# Patient Record
Sex: MALE | Race: WHITE | NOT HISPANIC OR LATINO | Employment: UNEMPLOYED | URBAN - METROPOLITAN AREA
[De-identification: names, ages, dates, MRNs, and addresses within clinical notes are randomized per-mention and may not be internally consistent; named-entity substitution may affect disease eponyms.]

---

## 2018-07-16 ENCOUNTER — APPOINTMENT (OUTPATIENT)
Dept: RADIOLOGY | Facility: MEDICAL CENTER | Age: 33
End: 2018-07-16
Attending: EMERGENCY MEDICINE

## 2018-07-16 ENCOUNTER — HOSPITAL ENCOUNTER (EMERGENCY)
Facility: MEDICAL CENTER | Age: 33
End: 2018-07-16
Attending: EMERGENCY MEDICINE

## 2018-07-16 VITALS
BODY MASS INDEX: 23.1 KG/M2 | HEIGHT: 71 IN | RESPIRATION RATE: 16 BRPM | TEMPERATURE: 97.5 F | DIASTOLIC BLOOD PRESSURE: 97 MMHG | OXYGEN SATURATION: 94 % | SYSTOLIC BLOOD PRESSURE: 119 MMHG | HEART RATE: 121 BPM | WEIGHT: 165 LBS
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
## 2018-07-16 DIAGNOSIS — Z91.199 MEDICALLY NONCOMPLIANT: ICD-10-CM

## 2018-07-16 DIAGNOSIS — F10.929 ACUTE ALCOHOLIC INTOXICATION WITH COMPLICATION (HCC): ICD-10-CM

## 2018-07-16 PROCEDURE — 99285 EMERGENCY DEPT VISIT HI MDM: CPT

## 2018-07-16 PROCEDURE — 72125 CT NECK SPINE W/O DYE: CPT

## 2018-07-16 PROCEDURE — 305950 HCHG YELLOW TRAUMA ACT PRE-NOTIFY NO CC

## 2018-07-16 PROCEDURE — 70450 CT HEAD/BRAIN W/O DYE: CPT

## 2018-07-16 ASSESSMENT — LIFESTYLE VARIABLES
DO YOU DRINK ALCOHOL: YES
CONSUMPTION TOTAL: POSITIVE
TOTAL SCORE: 0
HAVE YOU EVER FELT YOU SHOULD CUT DOWN ON YOUR DRINKING: NO
EVER FELT BAD OR GUILTY ABOUT YOUR DRINKING: NO
AVERAGE NUMBER OF DAYS PER WEEK YOU HAVE A DRINK CONTAINING ALCOHOL: 7
TOTAL SCORE: 0
HAVE PEOPLE ANNOYED YOU BY CRITICIZING YOUR DRINKING: NO
ON A TYPICAL DAY WHEN YOU DRINK ALCOHOL HOW MANY DRINKS DO YOU HAVE: 10
TOTAL SCORE: 0
EVER HAD A DRINK FIRST THING IN THE MORNING TO STEADY YOUR NERVES TO GET RID OF A HANGOVER: NO
HOW MANY TIMES IN THE PAST YEAR HAVE YOU HAD 5 OR MORE DRINKS IN A DAY: 365

## 2018-07-16 ASSESSMENT — PAIN SCALES - GENERAL: PAINLEVEL_OUTOF10: 0

## 2018-07-16 NOTE — ED NOTES
Patient yelling that he wants to leave and he doesn't need to be hear.  Patient cursing at staff to let him leave.  Dr. Walter aware.

## 2018-07-16 NOTE — ED NOTES
BIB [facility], pt fell from 30ft tree, confused and combative with head laceration and shoulder laceration. VSS.

## 2018-07-16 NOTE — DISCHARGE PLANNING
Medical Social Work    Referral: Trauma Yellow    Intervention: MSW responded to trauma bay.  Pt is a 33 year old [facility] falling approximately 30 feet out of a tree.  Pt ambulated into trauma bay.  Pt is Jaden Hill (: 1985).  Pt is +ETOH, somewhat confused and combative.  [employer] officer ARCELIA Rae is in trauma bay.  Pt to CT then BL16.    Plan: SW will follow as needed.

## 2018-07-16 NOTE — ED NOTES
Jaden Hill discharged via ambulatory with steady gait with self.  Discharge instructions given and reviewed, patient educated to follow up with PCP or as needed with ED, verbalized understanding.  Prescriptions given x 0.  All personal belongings in possession.  No questions at this time.

## 2018-07-16 NOTE — DISCHARGE INSTRUCTIONS
Head Injury, Adult  There are many types of head injuries. They can be as minor as a bump. Some head injuries can be worse. Worse injuries include:  · A strong hit to the head that hurts the brain (concussion).  · A bruise of the brain (contusion). This means there is bleeding in the brain that can cause swelling.  · A cracked skull (skull fracture).  · Bleeding in the brain that gathers, gets thick (makes a clot), and forms a bump (hematoma).  Most problems from a head injury come in the first 24 hours. However, you may still have side effects up to 7-10 days after your injury. It is important to watch your condition for any changes.  Follow these instructions at home:  Activity  · Rest as much as possible.  · Avoid activities that are hard or tiring.  · Make sure you get enough sleep.  · Limit activities that need a lot of thought or attention, such as:  ¨ Watching TV.  ¨ Playing memory games and puzzles.  ¨ Job-related work or homework.  ¨ Working on the computer, social media, and texting.  · Avoid activities that could cause another head injury until your doctor says it is okay. This includes playing sports.  · Ask your doctor when it is safe for you to go back to your normal activities, such as work or school. Ask your doctor for a step-by-step plan for slowly going back to your normal activities.  · Ask your doctor when you can drive, ride a bicycle, or use heavy machinery. Never do these activities if you are dizzy.  Lifestyle  · Do not drink alcohol until your doctor says it is okay.  · Avoid drug use.  · If it is harder than usual to remember things, write them down.  · If you are easily distracted, try to do one thing at a time.  · Talk with family members or close friends when making important decisions.  · Tell your friends, family, a trusted coworker, and  about your injury, symptoms, and limits (restrictions). Have them watch for any problems that are new or getting worse.  General  instructions  · Take over-the-counter and prescription medicines only as told by your doctor.  · Have someone stay with you for 24 hours after your head injury. This person should watch you for any changes in your symptoms and be ready to get help.  · Keep all follow-up visits as told by your doctor. This is important.  Prevention  · Work on your balance and strength. This can help you avoid falls.  · Wear a seatbelt when you are in a moving vehicle.  · Wear a helmet when:  ¨ Riding a bicycle.  ¨ Skiing.  ¨ Doing any other sport or activity that has a risk of injury.  · Drink alcohol only in moderation.  · Make your home safer by:  ¨ Getting rid of clutter from the floors and stairs, like things that can make you trip.  ¨ Using grab bars in bathrooms and handrails by stairs.  ¨ Placing non-slip mats on floors and in bathtubs.  ¨ Putting more light in dim areas.  Get help right away if:  · You have:  ¨ A very bad (severe) headache that is not helped by medicine.  ¨ Trouble walking or weakness in your arms and legs.  ¨ Clear or bloody fluid coming from your nose or ears.  ¨ Changes in your seeing (vision).  ¨ Jerky movements that you cannot control (seizure).  · You throw up (vomit).  · Your symptoms get worse.  · You lose balance.  · Your speech is slurred.  · You pass out.  · You are sleepier and have trouble staying awake.  · The black centers of your eyes (pupils) change in size.  These symptoms may be an emergency. Do not wait to see if the symptoms will go away. Get medical help right away. Call your local emergency services (911 in the [place] Do not drive yourself to the hospital.   This information is not intended to replace advice given to you by your health care provider. Make sure you discuss any questions you have with your health care provider.  Document Released: 11/30/2009 Document Revised: 07/13/2017 Document Reviewed: 06/27/2017  Elsevier Interactive Patient Education © 2017 Elsevier Inc.      How  "Much is Too Much Alcohol?  Drinking too much alcohol can cause injury, accidents, and health problems. These types of problems can include:   · Car crashes.  · Falls.  · Family fighting (domestic violence).  · Drowning.  · Fights.  · Injuries.  · Burns.  · Damage to certain organs.  · Having a baby with birth defects.  ONE DRINK CAN BE TOO MUCH WHEN YOU ARE:  · Working.  · Pregnant or breastfeeding.  · Taking medicines. Ask your doctor.  · Driving or planning to drive.  WHAT IS A STANDARD DRINK?   · 1 regular beer (12 ounces or 360 milliliters).  · 1 glass of wine (5 ounces or 150 milliliters).  · 1 shot of liquor (1.5 ounces or 45 milliliters).  BLOOD ALCOHOL LEVELS   · .00 A person is sober.  · .03 A person has no trouble keeping balance, talking, or seeing right, but a \"buzz\" may be felt.  · .05 A person feels \"buzzed\" and relaxed.  · .08 or .10  A person is drunk. He or she has trouble talking, seeing right, and keeping his or her balance.  · .15 A person loses body control and may pass out (blackout).  · .20 A person has trouble walking (staggering) and throws up (vomits).  · .30 A person will pass out (unconscious).  · .40+ A person will be in a coma. Death is possible.  If you or someone you know has a drinking problem, get help from a doctor.   Document Released: 10/14/2010 Document Revised: 03/11/2013 Document Reviewed: 10/14/2010  [employer]® Patient Information ©2014 [employer].    "

## 2018-07-16 NOTE — ED PROVIDER NOTES
"ED Provider Note    Scribed for No att. providers found by Timmy Mccrary. 7/16/2018  6:13 AM    Primary care provider: None noted  Means of arrival: EMS  History obtained from: EMS, patient  History limited by: uncooperative    CHIEF COMPLAINT  Chief Complaint   Patient presents with   • Trauma Yellow       HPI  Cadet Lynn is a 89 y.o. unknown who presents to the Emergency Department as a trauma yellow for medical clearance post fall from 30 feet out of a tree just prior to arrival. Per EMS the patient fell approximately 30 feet out of a tree. He was found to have a laceration to his posterior head transported to [facility] for medical clearance. Patient denies pain or any medical complaint at this time.    Further HPI cannot be obtained due to the patient's uncooperativeness.    REVIEW OF SYSTEMS  Pertinent positives include laceration.   Pertinent negatives include no medical complaints.    All other systems reviewed and negative.      Further ROS cannot be obtained due to the patient's uncooperativeness.  C.    PAST MEDICAL HISTORY   None note    SURGICAL HISTORY   has a past surgical history that includes other and ear reconstruction.    SOCIAL HISTORY  Social History   Substance Use Topics   • Smoking status: None noted   • Smokeless tobacco: None note   • Alcohol use None note      History   Drug use: None note       FAMILY HISTORY  None note    CURRENT MEDICATIONS  No current facility-administered medications for this encounter.   No current outpatient prescriptions on file.    ALLERGIES  No Known Allergies    PHYSICAL EXAM  VITAL SIGNS: BP (!) 119/97   Pulse 121   Temp 36.4 °C (97.5 °F)   Resp 16   Ht 1.803 m (5' 11\")   Wt 74.8 kg (165 lb)   SpO2 94%   BMI 23.01 kg/m²     Nursing note and vitals reviewed.  Constitutional: Well-developed and well-nourished. No distress.  Somewhat angry and argumentative.   HENT: Head is normocephalic. Oropharynx is clear and moist without exudate or erythema. " "Left sided 3x4 cm cephalohemoatoma to parietooccipal scalp. No repairable laceration.   Eyes: Pupils are equal, round, and reactive to light. Conjunctiva are normal.   Cardiovascular: Normal rate and regular rhythm. No murmur heard. Normal radial pulses.  Pulmonary/Chest: Breath sounds normal. No wheezes or rales.   Abdominal: Soft and non-tender. No distention    Musculoskeletal: Extremities exhibit normal range of motion without edema or tenderness. No cervical spine tenderness to palpation.  Neurological: Awake, alert and oriented to person, and time. Not oriented to place. No focal deficits noted.  Skin: Skin is warm and dry. No rash.   Psychiatric: Angry, argumentative. Claims there is \"nothing wrong with me\".     DIAGNOSTIC STUDIES / PROCEDURES    RADIOLOGY  CT-CSPINE WITHOUT PLUS RECONS   Final Result      No acute abnormality identified.      CT-HEAD W/O   Final Result      1.  LEFT parietal scalp hematoma and laceration   2.  No acute intracranial hemorrhage      The radiologist's interpretation of all radiological studies have been reviewed by me.    COURSE & MEDICAL DECISION MAKING  Nursing notes, VS, PMSFHx reviewed in chart.     6:13 AM - Patient seen and examined at bedside. Patient is refusing services in the ED and is unwilling to cooperate. I discussed his mechanism and possible injuries that he could have sustained during his fall. Patient appears cliniclaly intoxicated and uncooperative, and is not fully oriented. After some convincing he agrees to scan. Ordered CT head, CT C spine to evaluate his symptoms. The differential diagnoses include but are not limited to: spinal fracture, contusion, laceration     6:50 AM - Recheck: Patient re-evaluated at beside. He is seen ambulating to the bathroom unassisted and without difficulty. Patient reports that he is feeling well and expresses a desire to discharge. Patient's radiology results discussed. There are no signs of acute emergency. Discussed " patient's condition and treatment plan. Although he was initially not oriented to place, I feel that he is able to discharge as he is otherwise oriented, speaking clear full sentences and ambulatory without difficulty. I do not feel that his medical decision making is impaired at this time. Patient will be discharged with instructions and provided with strict return precautions. Advised to follow up with his primary. Instructed to return to Emergency Department immediately if any new or worsening symptoms.    The patient will return for new or worsening symptoms and is stable at the time of discharge.    The patient is referred to a primary physician for blood pressure management, diabetic screening, and for all other preventative health concerns.    DISPOSITION:  Patient will be discharged home in stable condition.    FOLLOW UP:  [facility], Emergency Dept  [address] [address] [phone]    If symptoms worsen      OUTPATIENT MEDICATIONS:  There are no discharge medications for this patient.        FINAL IMPRESSION  1. Fall, initial encounter    2. Acute alcoholic intoxication with complication (HCC)    3. Medically noncompliant          I, Timmy Mccrary (Jeanie), am scribing for, and in the presence of, No att. providers found.    Electronically signed by: Timmy Mccrary (Jeanie), 7/16/2018    I, No att. providers found personally performed the services described in this documentation, as scribed by Timmy Mccrary in my presence, and it is both accurate and complete.    The note accurately reflects work and decisions made by me.  Josh Walter  7/16/2018  1:16 PM

## 2018-07-16 NOTE — ED NOTES
Report from Fransisca RN at this time.  Patient wheeled to rm 16 at this time and helped into gurney

## 2019-10-29 PROCEDURE — 99283 EMERGENCY DEPT VISIT LOW MDM: CPT

## 2019-10-29 PROCEDURE — 303977 HCHG I & D

## 2019-10-30 ENCOUNTER — HOSPITAL ENCOUNTER (EMERGENCY)
Facility: MEDICAL CENTER | Age: 34
End: 2019-10-30
Attending: EMERGENCY MEDICINE
Payer: MEDICAID

## 2019-10-30 VITALS
TEMPERATURE: 97.2 F | HEIGHT: 71 IN | RESPIRATION RATE: 16 BRPM | WEIGHT: 150.13 LBS | HEART RATE: 86 BPM | SYSTOLIC BLOOD PRESSURE: 119 MMHG | OXYGEN SATURATION: 98 % | BODY MASS INDEX: 21.02 KG/M2 | DIASTOLIC BLOOD PRESSURE: 76 MMHG

## 2019-10-30 DIAGNOSIS — L02.519 ABSCESS OF INDEX FINGER: Primary | ICD-10-CM

## 2019-10-30 PROCEDURE — 700102 HCHG RX REV CODE 250 W/ 637 OVERRIDE(OP): Performed by: EMERGENCY MEDICINE

## 2019-10-30 PROCEDURE — A9270 NON-COVERED ITEM OR SERVICE: HCPCS | Performed by: EMERGENCY MEDICINE

## 2019-10-30 PROCEDURE — 303977 HCHG I & D

## 2019-10-30 RX ORDER — SULFAMETHOXAZOLE AND TRIMETHOPRIM 800; 160 MG/1; MG/1
1 TABLET ORAL 2 TIMES DAILY
Qty: 14 TAB | Refills: 0 | Status: SHIPPED | OUTPATIENT
Start: 2019-10-30 | End: 2019-11-06

## 2019-10-30 RX ORDER — SULFAMETHOXAZOLE AND TRIMETHOPRIM 800; 160 MG/1; MG/1
1 TABLET ORAL ONCE
Status: COMPLETED | OUTPATIENT
Start: 2019-10-30 | End: 2019-10-30

## 2019-10-30 RX ORDER — LIDOCAINE HYDROCHLORIDE 10 MG/ML
5 INJECTION, SOLUTION INFILTRATION; PERINEURAL ONCE
Status: DISCONTINUED | OUTPATIENT
Start: 2019-10-30 | End: 2019-10-30 | Stop reason: HOSPADM

## 2019-10-30 RX ADMIN — SULFAMETHOXAZOLE AND TRIMETHOPRIM 1 TABLET: 800; 160 TABLET ORAL at 01:30

## 2019-10-30 ASSESSMENT — ENCOUNTER SYMPTOMS
COUGH: 0
SHORTNESS OF BREATH: 0
HEADACHES: 0
ABDOMINAL PAIN: 0
CONSTIPATION: 0
DIARRHEA: 0
BLURRED VISION: 0
NAUSEA: 0
DIAPHORESIS: 0
VOMITING: 0
BRUISES/BLEEDS EASILY: 0
FEVER: 0
DIZZINESS: 0
CHILLS: 0
SORE THROAT: 0
PALPITATIONS: 0

## 2019-10-30 NOTE — ED PROVIDER NOTES
"ED Provider Note   10/30/2019  1:02 AM    Means of Arrival: Walk In  History obtained by: patient    CHIEF COMPLAINT  Chief Complaint   Patient presents with   • Digit Pain     R side index finger. pt thinks it is infected       HPI  Jaden Hill is a 34 y.o. male presents with pain at right index finger (points to distal lateral finger near DIP joint).  States about 1 month ago the finger was injured with a piece of glass has not completely healed since then.  Says that he was bumping his finger on things, works on bikes a lot. Over the past few days swelling and pain have worsened. Decreased range of motion due to pain.  No discharge or drainage. Throbbing pain. He says, \"It needs to be cut open.\"  Denies fevers, chills or any other acute complaints.  Has got a tetanus shot 3 years ago when he was in detention.    REVIEW OF SYSTEMS  Review of Systems   Constitutional: Negative for chills, diaphoresis and fever.   HENT: Negative for sore throat.    Eyes: Negative for blurred vision.   Respiratory: Negative for cough and shortness of breath.    Cardiovascular: Negative for chest pain, palpitations and leg swelling.   Gastrointestinal: Negative for abdominal pain, constipation, diarrhea, nausea and vomiting.   Genitourinary: Negative for dysuria and hematuria.   Musculoskeletal: Positive for joint pain.        Right index finger pain at the DIP   Skin: Negative for rash.   Neurological: Negative for dizziness and headaches.   Endo/Heme/Allergies: Does not bruise/bleed easily.     See HPI for further details.     PAST MEDICAL HISTORY   denies chronic medical problems.     SOCIAL HISTORY  Social History     Tobacco Use   • Smoking status: Current Every Day Smoker     Packs/day: 0.25     Types: Cigarettes   • Smokeless tobacco: Never Used   Substance and Sexual Activity   • Alcohol use: Yes     Comment: states no   • Drug use: Yes     Comment: marijuana   • Sexual activity: Not on file       SURGICAL HISTORY   has a past " "surgical history that includes other and ear reconstruction.    CURRENT MEDICATIONS  Home Medications     Reviewed by Marica Rae R.N. (Registered Nurse) on 10/29/19 at 2357  Med List Status: Partial   Medication Last Dose Status        Patient Alexandru Taking any Medications                       ALLERGIES  No Known Allergies    PHYSICAL EXAM  VITAL SIGNS: /76   Pulse 86   Temp 36.2 °C (97.2 °F) (Temporal)   Resp 16   Ht 1.803 m (5' 11\")   Wt 68.1 kg (150 lb 2.1 oz)   SpO2 98%   BMI 20.94 kg/m²    Pulse ox interpretation:  interpret this pulse ox as normal.  Constitutional: Alert in no apparent distress.  HENT: Normocephalic, Atraumatic, Bilateral external ears normal. Nose normal.   Eyes: Pupils are equal. Conjunctiva normal, non-icteric.   Heart: Regular rate and rythm, no murmurs.  Well-perfused digits.  Lungs: No respiratory distress, regular respirations. Clear to auscultation bilaterally.  Skin: Warm, Dry, see musculoskeletal exam.  Neurologic: Alert,  No slurred speech.  There is diminished sensation to light touch at the affected area of the right index finger.  He has good strength at all joint levels of the right and left hand.  Ambulatory in room.  MSK: Swelling, erythema, tenderness and decreased range of motion at the DIP of the second right digit.   Psychiatric: Affect normal, Judgment normal, Mood normal, Appears appropriate and not intoxicated.   Physical Exam   Musculoskeletal:        Hands:       INCISION AND DRAINAGE PROCEDURE NOTE:  Patient identification was confirmed and consent was obtained.  This procedure was performed by Dr. Deluca with Dr Alejo supervising at 1:20AM.  Site: R index finger  Sterile procedures observed, cleaned with betadine.  Anesthetic used (type and amt): 1% Lidocaine without epi digit block performed by Dr Alejo  Blade size: 11  Drainage: Purulent  Packing used none  Site anesthetized with digit block, incision made over site (0.5 to 1 cm stab " incision), wound drained and explored loculations, irrigated, covered with dry, sterile dressing. Pt tolerated procedure well without complications. Instructions for care discussed verbally and pt provided with additional written instructions for homecare and f/u.      COURSE & MEDICAL DECISION MAKING  Pertinent Labs & Imaging studies reviewed. (See chart for details)    1:02 AM This is an emergent evaluation of a 34 y.o., male who presents with right index finger pain.  Physical exam significant for Swelling, erythema, tenderness and decreased range of motion at the DIP of the second right digit.  Decreased range of motion is due to pain and edema.  I do not think this is consistent with flexor tenosynovitis.  The infection is overlying the lateral side of the joint and not over the flexor tendon.  The differential diagnosis includes but is not limited to abscess, cellulitis. Will be treated with I&D and discharged with antibiotics.  Lefty Deluca M.D.    1:26AM I&D performed.  There was significant amount of purulent discharge.  After the procedure he had improved range of motion at the DIP joint.  Covered with antibiotic ointment and given script for abx. Lefty Deluca M.D.    I, Armando Alejo MD, acting as attending physician for resident Dr. Deluca.  I have read the above history and physical written by Dr. Deluca.  I agree and have made additions were needed.  34-year-old male resented to us with concerns of pain at right index finger.  There is a healing wound at the distal index finger to the lateral side.  There is edema and slight fluctuance.  A digital block was done and a stab incision was placed over the affected area.  There was purulent drainage.  Range of motion of DIP joint improved after this.  He was treated with Bactrim here and prescribed 7-day course.  He was provided with information for orthopedic clinic where he can see a hand specialist.  He is homeless and I believe there will be some  limitations for him getting into that clinic.  If he is unable to I have asked him to come to the emergency department at any time if there is worsening.  I encouraged him to come back as soon as possible if swelling recurs, fever or any other worsening symptoms.  Armando Alejo II 10/30/2019 3:06 AM       The patient will return for worsening symptoms and is stable at the time of discharge. The patient verbalizes understanding. Guidance was provided on appropriate use of medications including driving under the influence, overdose, and side effects.     FINAL IMPRESSION  1. Abscess of index finger        Electronically signed by: Armando Alejo II, 10/30/2019 1:02 AM

## 2019-10-30 NOTE — ED TRIAGE NOTES
"Chief Complaint   Patient presents with   • Digit Pain     R side index finger. pt thinks it is infected     Pt ambulatory to triage for above complaint. Pt's finger is notably swollen and has a wound around it. Pt states he hits it on a lot of things and works on bikes a lot and hit it with a wrench not too long ago. CMS intact. Tetanus is unknown.     /80   Pulse 100   Temp 36.2 °C (97.2 °F) (Temporal)   Resp 16   Ht 1.803 m (5' 11\")   Wt 68.1 kg (150 lb 2.1 oz)   SpO2 97%   BMI 20.94 kg/m²     "

## 2019-10-30 NOTE — ED NOTES
Pt understands discharge instructions follow up and prescriptions given.   No s/s of adverse reactions to meds given.

## 2019-12-01 ENCOUNTER — HOSPITAL ENCOUNTER (EMERGENCY)
Dept: HOSPITAL 8 - ED | Age: 34
Discharge: HOME | End: 2019-12-01
Payer: MEDICAID

## 2019-12-01 VITALS — BODY MASS INDEX: 23.36 KG/M2 | HEIGHT: 70 IN | WEIGHT: 163.14 LBS

## 2019-12-01 VITALS — SYSTOLIC BLOOD PRESSURE: 114 MMHG | DIASTOLIC BLOOD PRESSURE: 77 MMHG

## 2019-12-01 DIAGNOSIS — L02.415: ICD-10-CM

## 2019-12-01 DIAGNOSIS — L03.115: Primary | ICD-10-CM

## 2019-12-01 PROCEDURE — 99283 EMERGENCY DEPT VISIT LOW MDM: CPT

## 2019-12-01 PROCEDURE — 10060 I&D ABSCESS SIMPLE/SINGLE: CPT

## 2019-12-01 NOTE — NUR
PT PRESENTED WITH C/O SPIDER BITES, STATED BROWN RECLUSE SPIDER BITES ON THE 
RIGHT POST LEG. PT SITTING ON [facility], PROVIDED PT WITH DAMARIS POWELL AT BEDSIDE FOR 
EVAL

## 2020-04-14 ENCOUNTER — HOSPITAL ENCOUNTER (EMERGENCY)
Facility: MEDICAL CENTER | Age: 35
End: 2020-04-14
Attending: EMERGENCY MEDICINE
Payer: MEDICAID

## 2020-04-14 VITALS
DIASTOLIC BLOOD PRESSURE: 67 MMHG | TEMPERATURE: 97.5 F | HEIGHT: 71 IN | BODY MASS INDEX: 21.11 KG/M2 | SYSTOLIC BLOOD PRESSURE: 126 MMHG | RESPIRATION RATE: 16 BRPM | OXYGEN SATURATION: 98 % | WEIGHT: 150.79 LBS | HEART RATE: 84 BPM

## 2020-04-14 DIAGNOSIS — J03.00 STREP TONSILLITIS: ICD-10-CM

## 2020-04-14 LAB
ALBUMIN SERPL BCP-MCNC: 3.5 G/DL (ref 3.2–4.9)
ALBUMIN/GLOB SERPL: 0.9 G/DL
ALP SERPL-CCNC: 95 U/L (ref 30–99)
ALT SERPL-CCNC: 15 U/L (ref 2–50)
ANION GAP SERPL CALC-SCNC: 12 MMOL/L (ref 7–16)
AST SERPL-CCNC: 15 U/L (ref 12–45)
BASOPHILS # BLD AUTO: 0.9 % (ref 0–1.8)
BASOPHILS # BLD: 0.06 K/UL (ref 0–0.12)
BILIRUB SERPL-MCNC: <0.2 MG/DL (ref 0.1–1.5)
BUN SERPL-MCNC: 15 MG/DL (ref 8–22)
CALCIUM SERPL-MCNC: 8.5 MG/DL (ref 8.5–10.5)
CHLORIDE SERPL-SCNC: 94 MMOL/L (ref 96–112)
CO2 SERPL-SCNC: 28 MMOL/L (ref 20–33)
CREAT SERPL-MCNC: 0.89 MG/DL (ref 0.5–1.4)
EOSINOPHIL # BLD AUTO: 0.11 K/UL (ref 0–0.51)
EOSINOPHIL NFR BLD: 1.8 % (ref 0–6.9)
ERYTHROCYTE [DISTWIDTH] IN BLOOD BY AUTOMATED COUNT: 39.7 FL (ref 35.9–50)
GLOBULIN SER CALC-MCNC: 3.7 G/DL (ref 1.9–3.5)
GLUCOSE SERPL-MCNC: 105 MG/DL (ref 65–99)
HCT VFR BLD AUTO: 40.6 % (ref 42–52)
HGB BLD-MCNC: 13.7 G/DL (ref 14–18)
LYMPHOCYTES # BLD AUTO: 1.66 K/UL (ref 1–4.8)
LYMPHOCYTES NFR BLD: 26.8 % (ref 22–41)
MANUAL DIFF BLD: NORMAL
MCH RBC QN AUTO: 30.2 PG (ref 27–33)
MCHC RBC AUTO-ENTMCNC: 33.7 G/DL (ref 33.7–35.3)
MCV RBC AUTO: 89.6 FL (ref 81.4–97.8)
METAMYELOCYTES NFR BLD MANUAL: 0.9 %
MONOCYTES # BLD AUTO: 0.33 K/UL (ref 0–0.85)
MONOCYTES NFR BLD AUTO: 5.3 % (ref 0–13.4)
MORPHOLOGY BLD-IMP: NORMAL
NEUTROPHILS # BLD AUTO: 3.99 K/UL (ref 1.82–7.42)
NEUTROPHILS NFR BLD: 64.3 % (ref 44–72)
NRBC # BLD AUTO: 0 K/UL
NRBC BLD-RTO: 0 /100 WBC
OVALOCYTES BLD QL SMEAR: NORMAL
PLATELET # BLD AUTO: 321 K/UL (ref 164–446)
PLATELET BLD QL SMEAR: NORMAL
PMV BLD AUTO: 8.5 FL (ref 9–12.9)
POTASSIUM SERPL-SCNC: 3.6 MMOL/L (ref 3.6–5.5)
PROT SERPL-MCNC: 7.2 G/DL (ref 6–8.2)
RBC # BLD AUTO: 4.53 M/UL (ref 4.7–6.1)
RBC BLD AUTO: PRESENT
S PYO DNA SPEC NAA+PROBE: DETECTED
SODIUM SERPL-SCNC: 134 MMOL/L (ref 135–145)
WBC # BLD AUTO: 6.2 K/UL (ref 4.8–10.8)

## 2020-04-14 PROCEDURE — 700105 HCHG RX REV CODE 258: Performed by: EMERGENCY MEDICINE

## 2020-04-14 PROCEDURE — 85027 COMPLETE CBC AUTOMATED: CPT

## 2020-04-14 PROCEDURE — 700111 HCHG RX REV CODE 636 W/ 250 OVERRIDE (IP): Performed by: EMERGENCY MEDICINE

## 2020-04-14 PROCEDURE — 96375 TX/PRO/DX INJ NEW DRUG ADDON: CPT

## 2020-04-14 PROCEDURE — 80053 COMPREHEN METABOLIC PANEL: CPT

## 2020-04-14 PROCEDURE — 87651 STREP A DNA AMP PROBE: CPT

## 2020-04-14 PROCEDURE — 85007 BL SMEAR W/DIFF WBC COUNT: CPT

## 2020-04-14 PROCEDURE — 96365 THER/PROPH/DIAG IV INF INIT: CPT

## 2020-04-14 PROCEDURE — 99284 EMERGENCY DEPT VISIT MOD MDM: CPT

## 2020-04-14 RX ORDER — KETOROLAC TROMETHAMINE 30 MG/ML
30 INJECTION, SOLUTION INTRAMUSCULAR; INTRAVENOUS ONCE
Status: COMPLETED | OUTPATIENT
Start: 2020-04-14 | End: 2020-04-14

## 2020-04-14 RX ORDER — AMOXICILLIN 875 MG/1
875 TABLET, COATED ORAL 2 TIMES DAILY
Qty: 20 TAB | Refills: 0 | Status: SHIPPED | OUTPATIENT
Start: 2020-04-14

## 2020-04-14 RX ORDER — ONDANSETRON 2 MG/ML
4 INJECTION INTRAMUSCULAR; INTRAVENOUS ONCE
Status: COMPLETED | OUTPATIENT
Start: 2020-04-14 | End: 2020-04-14

## 2020-04-14 RX ADMIN — ONDANSETRON 4 MG: 2 INJECTION INTRAMUSCULAR; INTRAVENOUS at 14:08

## 2020-04-14 RX ADMIN — KETOROLAC TROMETHAMINE 30 MG: 30 INJECTION, SOLUTION INTRAMUSCULAR at 15:45

## 2020-04-14 RX ADMIN — CEFTRIAXONE SODIUM 2 G: 2 INJECTION, POWDER, FOR SOLUTION INTRAMUSCULAR; INTRAVENOUS at 15:45

## 2020-04-14 NOTE — ED TRIAGE NOTES
"PT ambulated to triage c/o \"not feeling well\" x 1 week.  PT reports he has had a headache, nausea and dizziness x 1 week  Chief Complaint   Patient presents with   • Nausea   • Dizziness   • Headache     /62   Pulse 88   Temp 36.4 °C (97.5 °F) (Oral)   Resp 16   Ht 1.803 m (5' 11\")   Wt 68.4 kg (150 lb 12.7 oz)   SpO2 99%     PT denies travel outside the [facility] in the past month, denies fevers, denies cough, denies contact with anyone who has tested positive for COVID.  Mask applied in triage.    "

## 2020-04-14 NOTE — ED NOTES
Patient medicated and provided with box lunch. Denies further needs. IV antibiotics in progress. Call bell remains within reach.

## 2020-04-14 NOTE — ED NOTES
Patient discharged in stable condition per orders. IV access removed - bandage applied. Wristband removed per protocol. Patient verbalized understanding of all discharge instructions. All belongings accounted for. Ambulatory to lobby with steady gait.

## 2020-04-14 NOTE — ED NOTES
Patient resting on gurney in supine position. No acute distress. Active chest rise and fall noted. No aggression/agitation noted. No behavioral pain indicators noted. Call bell remains within reach.

## 2020-04-15 NOTE — ED PROVIDER NOTES
ED Provider Note    CHIEF COMPLAINT  Chief Complaint   Patient presents with   • Nausea   • Dizziness   • Headache       HPI  Jaden Hill is a 34 y.o. male who presents the emergency room today with complaints of headache, chills, nausea and sore throat.  Patient states his symptoms been present for last several days to week not getting better and has had weakness from this.  No nausea vomiting no chest pain or shortness of breath.  No known Covid contacts or travel outside the area.    REVIEW OF SYSTEMS  See HPI for further details. All other systems are negative.      PAST MEDICAL HISTORY  No past medical history on file.    FAMILY HISTORY  History reviewed. No pertinent family history.    SOCIAL HISTORY  Social History     Socioeconomic History   • Marital status: Single     Spouse name: Not on file   • Number of children: Not on file   • Years of education: Not on file   • Highest education level: Not on file   Occupational History   • Not on file   Social Needs   • Financial resource strain: Not on file   • Food insecurity     Worry: Not on file     Inability: Not on file   • Transportation needs     Medical: Not on file     Non-medical: Not on file   Tobacco Use   • Smoking status: Current Every Day Smoker     Packs/day: 0.25     Types: Cigarettes   • Smokeless tobacco: Never Used   Substance and Sexual Activity   • Alcohol use: Yes     Comment: states no   • Drug use: Yes     Comment: marijuana   • Sexual activity: Not on file   Lifestyle   • Physical activity     Days per week: Not on file     Minutes per session: Not on file   • Stress: Not on file   Relationships   • Social connections     Talks on phone: Not on file     Gets together: Not on file     Attends Judaism service: Not on file     Active member of club or organization: Not on file     Attends meetings of clubs or organizations: Not on file     Relationship status: Not on file   • Intimate partner violence     Fear of current or ex partner:  "Not on file     Emotionally abused: Not on file     Physically abused: Not on file     Forced sexual activity: Not on file   Other Topics Concern   • Not on file   Social History Narrative   • Not on file       SURGICAL HISTORY  Past Surgical History:   Procedure Laterality Date   • EAR RECONSTRUCTION     • OTHER         CURRENT MEDICATIONS  Home Medications    **Home medications have not yet been reviewed for this encounter**         ALLERGIES  No Known Allergies    PHYSICAL EXAM  VITAL SIGNS: /67   Pulse 84   Temp 36.4 °C (97.5 °F) (Oral)   Resp 16   Ht 1.803 m (5' 11\")   Wt 68.4 kg (150 lb 12.7 oz)   SpO2 98%   BMI 21.03 kg/m²       Constitutional :  Well developed, Well nourished, No acute distress, Non-toxic appearance.   HENT: Pharynx injected with some swelling of the tonsils not obstructed no abscess noted  Eyes: perrla  Neck: Normal range of motion, No tenderness, Supple, No stridor.   Lymphatic: Some submandibular lymphadenopathy.   Cardiovascular: Normal heart rate, Normal rhythm, No murmurs, No rubs, No gallops.   Thorax & Lungs: Clear to auscultation all fields  Skin: Warm, Dry, No erythema, No rash.   Extremities: Intact distal pulses, No edema, No tenderness, No cyanosis, No clubbing.       RADIOLOGY/PROCEDURES  No orders to display         COURSE & MEDICAL DECISION MAKING  Pertinent Labs & Imaging studies reviewed. (See chart for details)  Patient has positive strep test with normal white blood cell count and fever he was started on Rocephin IV piggyback placed on amoxicillin for home may use Tylenol Motrin will follow-up given [facility].  Return if persistent worsening symptoms.  He verbalizes understanding above instructions    FINAL IMPRESSION  1.  Acute strep tonsillitis  2.   3.      Electronically signed by: Shree Gonsalez D.O., 4/14/2020    "

## 2020-10-01 ENCOUNTER — HOSPITAL ENCOUNTER (EMERGENCY)
Dept: HOSPITAL 8 - ED | Age: 35
Discharge: HOME | End: 2020-10-01
Payer: MEDICAID

## 2020-10-01 VITALS — HEIGHT: 71 IN | WEIGHT: 149.69 LBS | BODY MASS INDEX: 20.96 KG/M2

## 2020-10-01 VITALS — SYSTOLIC BLOOD PRESSURE: 131 MMHG | DIASTOLIC BLOOD PRESSURE: 84 MMHG
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
## 2020-10-01 DIAGNOSIS — S62.666B: Primary | ICD-10-CM
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
## 2020-10-01 PROCEDURE — 99284 EMERGENCY DEPT VISIT MOD MDM: CPT

## 2020-10-01 PROCEDURE — 12041 INTMD RPR N-HF/GENIT 2.5CM/<: CPT

## 2020-10-01 NOTE — NUR
DIRECTED PT TO WASH FINGER WITH BETADINE BRUSH WITH WATER. PT IS NOT LISTENING 
TO DIRECTIONS. PT IS PICKING AT FINGER AND PULLING SKIN OFF. DIRECTED PT TO 
CLEAN FINGER MULTIPLE TIMES WITHOUT SUCCESS.

## 2020-10-18 ENCOUNTER — HOSPITAL ENCOUNTER (EMERGENCY)
Dept: HOSPITAL 8 - ED | Age: 35
Discharge: HOME | End: 2020-10-18
Payer: MEDICAID

## 2020-10-18 VITALS — BODY MASS INDEX: 21.27 KG/M2 | HEIGHT: 71 IN | WEIGHT: 151.9 LBS

## 2020-10-18 VITALS — SYSTOLIC BLOOD PRESSURE: 119 MMHG | DIASTOLIC BLOOD PRESSURE: 88 MMHG

## 2020-10-18 DIAGNOSIS — T81.30XA: Primary | ICD-10-CM

## 2020-10-18 PROCEDURE — 99283 EMERGENCY DEPT VISIT LOW MDM: CPT

## 2020-10-18 PROCEDURE — 96372 THER/PROPH/DIAG INJ SC/IM: CPT

## 2020-11-02 ENCOUNTER — HOSPITAL ENCOUNTER (EMERGENCY)
Facility: MEDICAL CENTER | Age: 35
End: 2020-11-02
Attending: EMERGENCY MEDICINE
Payer: MEDICAID

## 2020-11-02 ENCOUNTER — APPOINTMENT (OUTPATIENT)
Dept: RADIOLOGY | Facility: MEDICAL CENTER | Age: 35
End: 2020-11-02
Attending: EMERGENCY MEDICINE
Payer: MEDICAID

## 2020-11-02 VITALS
RESPIRATION RATE: 16 BRPM | BODY MASS INDEX: 22.76 KG/M2 | OXYGEN SATURATION: 98 % | HEART RATE: 103 BPM | DIASTOLIC BLOOD PRESSURE: 83 MMHG | TEMPERATURE: 98.1 F | HEIGHT: 69 IN | WEIGHT: 153.66 LBS | SYSTOLIC BLOOD PRESSURE: 126 MMHG

## 2020-11-02 DIAGNOSIS — S43.005A DISLOCATION OF LEFT SHOULDER JOINT, INITIAL ENCOUNTER: ICD-10-CM

## 2020-11-02 PROCEDURE — 99283 EMERGENCY DEPT VISIT LOW MDM: CPT | Mod: 25

## 2020-11-02 PROCEDURE — 73030 X-RAY EXAM OF SHOULDER: CPT | Mod: LT

## 2020-11-02 PROCEDURE — 23650 CLTX SHO DSLC W/MNPJ WO ANES: CPT

## 2020-11-02 ASSESSMENT — FIBROSIS 4 INDEX: FIB4 SCORE: 0.42

## 2020-11-03 NOTE — ED TRIAGE NOTES
"Jaden Hill   35 y.o. male     Chief Complaint   Patient presents with   • Shoulder Pain     LEFT   • Shoulder Injury     Pt amb to triage with steady gait for above complaint.     Patient was packing up his tent, and slipped on tarp.  Patient left arm tried to catch his fall, and hyper extended it.  Left shoulder appears dislocated, CMS intact.       Pt is alert and oriented, speaking in full sentences, follows commands and responds appropriately to questions. NAD. Resp are even and unlabored.     Pt placed in lobby. Pt educated on triage process. Pt encouraged to alert staff for any changes.    /96   Pulse (!) 101   Temp 36.7 °C (98.1 °F) (Temporal)   Resp 16   Ht 1.753 m (5' 9\")   Wt 69.7 kg (153 lb 10.6 oz)   SpO2 96%   BMI 22.69 kg/m²     "

## 2020-11-03 NOTE — ED NOTES
Patient verbalizes understanding of follow up, pain management, care and when to return to the ED.  All questions answered

## 2020-11-03 NOTE — DISCHARGE INSTRUCTIONS
Take Ibuprofen and Tylenol for pain.  Wear the shoulder immobilizer for 3 days.  Follow-up with Dr. Sanchez.    You had a borderline or high normal blood pressure reading today.  This does not necessarily mean you have hypertension.  Please followup with your/a primary physician for comprehensive blood pressure evaluation and yearly fasting cholesterol assessment.  BP Readings from Last 3 Encounters:   11/02/20 145/96   04/14/20 126/67   10/30/19 119/76

## 2020-11-03 NOTE — ED PROVIDER NOTES
ED Provider Note    Scribed for José Luis Newton M.D. by Alee Walters. 11/2/2020  9:33 PM    Primary care provider: Pcp Pt States None  Means of arrival: Walk-in  History obtained from: Patient  History limited by: None noted    CHIEF COMPLAINT  Chief Complaint   Patient presents with   • Shoulder Pain     LEFT   • Shoulder Injury       HPI  Jaden Hill is a 35 y.o. male who presents to the Emergency Department with constant left shoulder pain onset tonight. Patient states he was packing up his tent, when he slipped on the tarp, catching himself with his left arm and hyper extended it. Patient endorses deformity to left shoulder, but denies any head, neck, back, right shoulder, arm, or leg pain. He notes he has mild weakness and numbness to left shoulder. No alleviating factors attempted. He denies any history of shoulder dislocation. He denies any history of allergies.     PPE Note: I personally donned full PPE for all patient encounters during this visit, including being clean-shaven with an N95 respirator mask, gloves, gown, and goggles.     Scribe remained outside the patient's room and did not have any contact with the patient for the duration of patient encounter.     REVIEW OF SYSTEMS  Pertinent positives include: left shoulder pain, weakness and numbness.  Pertinent negatives include: head, neck, back, right shoulder, arm, or leg pain.      PAST MEDICAL HISTORY  Prior left shoulder injury but no prior dislocation.  He did not seek care after his prior injury      SOCIAL HISTORY  Social History     Tobacco Use   • Smoking status: Current Every Day Smoker     Packs/day: 0.25     Types: Cigarettes   • Smokeless tobacco: Never Used   Substance Use Topics   • Alcohol use: Yes     Comment: states no   • Drug use: Yes     Comment: marijuana     Social History     Substance and Sexual Activity   Drug Use Yes    Comment: marijuana       SURGICAL HISTORY  Past Surgical History:   Procedure Laterality Date   • EAR  "RECONSTRUCTION     • OTHER         CURRENT MEDICATIONS  Home Medications     Reviewed by Paul Donnelly R.N. (Registered Nurse) on 11/02/20 at 2033  Med List Status: Partial   Medication Last Dose Status   amoxicillin (AMOXIL) 875 MG tablet  Active                ALLERGIES  No Known Allergies    PHYSICAL EXAM  VITAL SIGNS: /82   Pulse (!) 103   Temp 36.7 °C (98.1 °F) (Temporal)   Resp 16   Ht 1.753 m (5' 9\")   Wt 69.7 kg (153 lb 10.6 oz)   SpO2 99%   BMI 22.69 kg/m²   Reviewed and tachycardic, high normal blood pressure  Constitutional: Well developed, Well nourished, well-appearing.  HENT: Normocephalic, atraumatic, bilateral external ears normal, wearing a mask.   Eyes: PERRLA, conjunctiva pink, no scleral icterus.   Cardiovascular: Radial pulses 2+  Respiratory: Unlabored respirations  Skin: No wound or bruising around the left shoulder.   Musculoskeletal: Vacancy under the left acromium with an anterior fullness.  No focal tenderness of the clavicle, AC joint or humerus.  Neurologic: Alert & oriented x 3, cranial nerves 2-12 intact by passive exam.  Finger flexion extension abduction and thumb opposition preserved.  Sensation intact on the pads of the first and fifth fingers and over the first dorsal webspace of the hand.  Sensation intact over the deltoid.  No focal deficit noted.  Psychiatric: Affect normal, Judgment normal, Mood normal.     DIFFERENTIAL DIAGNOSIS:  Older dislocation, AC separation, fracture.    RADIOLOGY/PROCEDURES this was a post reduction x-ray  DX-SHOULDER 2+ LEFT   Final Result         1.  Slight superior displacement of the clavicular head in relation to the acromion, consider type II acromion injury   2.  No acute fracture is appreciated.        Radiologist interpretation have been reviewed by me.     REDUCTION PROCEDURE NOTE:  Patient identification was confirmed, consent was obtained verbally.  Traction vs contraction.   This procedure was performed at " 10:14 PM by Dr. Newton.  Site left shoulder  Pre-procedure N/V exam normal  # of attempts: 1  Patient placed in sling  fx/dislocation reduced successfully with manipulation. Patient tolerated procedure well without complications. Patient splinted. Post-procedure exam indicates patient is n/v intact distal to the injury site. Post-procedure films show excellent alignment. Patient  returned to baseline prior to disposition. Instructions for care discussed verbally and patient provided with additional written instructions for homecare and f/u.    ED COURSE:  Nursing notes, VS, PMSFHx reviewed in chart.     9:33 PM - Patient seen and examined at bedside. Patient is in no distress and does not require pain medications. Ordered Dx-shoulder (left) to evaluate.     10:14 PM - Patient was seen at bedside. Patient's shoulder was successfully reduced by me as detailed above. Patient reports having instant relief of pain following procedure. Patient was updated on the plan of care including discharge home. Patient verbalizes understanding and agreement to this plan of care.     The patient is noted to be hypertensive in the emergency department, referred to primary care physician for blood pressure management.    MEDICAL DECISION MAKING:  This patient presents with a left nondominant shoulder dislocation after a fall.  He has no evidence of acute AC separation.  There is no fracture.  He is reduced.  There is no neurologic or vascular compromise.    PLAN:  Sling for 3 days  Patient instructed to take Tylenol and Ibuprofen for pain.  Shoulder dislocation handout given  Return for worsening symptoms.    Darius Sanchez M.D.  [address]  [facility] [address]  [phone]    Schedule an appointment as soon as possible for a visit in 1 week        CONDITION: Stable.     FINAL IMPRESSION  1. Dislocation of left shoulder joint, initial encounter    2.      Reduction of shoulder dislocation by MD MADRIGAL, Alee Walters (Scribe), am  scribing for, and in the presence of, José Luis Newton M.D..    Electronically signed by: Alee Walters (Scribe), 11/2/2020    I, José Luis Newton M.D. personally performed the services described in this documentation, as scribed by Alee Walters in my presence, and it is both accurate and complete. C.    The note accurately reflects work and decisions made by me.  José Luis Newton M.D.  11/3/2020  12:35 AM

## 2021-03-03 ENCOUNTER — HOSPITAL ENCOUNTER (EMERGENCY)
Facility: MEDICAL CENTER | Age: 36
End: 2021-03-03
Attending: EMERGENCY MEDICINE
Payer: MEDICAID

## 2021-03-03 VITALS
OXYGEN SATURATION: 97 % | DIASTOLIC BLOOD PRESSURE: 84 MMHG | TEMPERATURE: 98.2 F | BODY MASS INDEX: 21.39 KG/M2 | WEIGHT: 152.78 LBS | HEART RATE: 97 BPM | HEIGHT: 71 IN | SYSTOLIC BLOOD PRESSURE: 111 MMHG | RESPIRATION RATE: 16 BRPM

## 2021-03-03 DIAGNOSIS — B00.9 HERPES: ICD-10-CM

## 2021-03-03 DIAGNOSIS — Z20.2 STD EXPOSURE: ICD-10-CM

## 2021-03-03 LAB
APPEARANCE UR: CLEAR
BACTERIA #/AREA URNS HPF: NEGATIVE /HPF
BILIRUB UR QL STRIP.AUTO: NEGATIVE
COLOR UR: YELLOW
EPI CELLS #/AREA URNS HPF: NEGATIVE /HPF
GLUCOSE UR STRIP.AUTO-MCNC: 100 MG/DL
HYALINE CASTS #/AREA URNS LPF: ABNORMAL /LPF
KETONES UR STRIP.AUTO-MCNC: NEGATIVE MG/DL
LEUKOCYTE ESTERASE UR QL STRIP.AUTO: NEGATIVE
MICRO URNS: ABNORMAL
NITRITE UR QL STRIP.AUTO: NEGATIVE
PH UR STRIP.AUTO: 5.5 [PH] (ref 5–8)
PROT UR QL STRIP: 30 MG/DL
RBC # URNS HPF: ABNORMAL /HPF
RBC UR QL AUTO: NEGATIVE
SP GR UR STRIP.AUTO: 1.03
SPERM #/AREA URNS HPF: ABNORMAL /HPF
UROBILINOGEN UR STRIP.AUTO-MCNC: 0.2 MG/DL
WBC #/AREA URNS HPF: ABNORMAL /HPF

## 2021-03-03 PROCEDURE — 96372 THER/PROPH/DIAG INJ SC/IM: CPT

## 2021-03-03 PROCEDURE — 81001 URINALYSIS AUTO W/SCOPE: CPT

## 2021-03-03 PROCEDURE — 700111 HCHG RX REV CODE 636 W/ 250 OVERRIDE (IP): Performed by: EMERGENCY MEDICINE

## 2021-03-03 PROCEDURE — 700102 HCHG RX REV CODE 250 W/ 637 OVERRIDE(OP): Performed by: EMERGENCY MEDICINE

## 2021-03-03 PROCEDURE — 99284 EMERGENCY DEPT VISIT MOD MDM: CPT

## 2021-03-03 PROCEDURE — A9270 NON-COVERED ITEM OR SERVICE: HCPCS | Performed by: EMERGENCY MEDICINE

## 2021-03-03 RX ORDER — DOXYCYCLINE 100 MG/1
100 TABLET ORAL ONCE
Status: COMPLETED | OUTPATIENT
Start: 2021-03-03 | End: 2021-03-03

## 2021-03-03 RX ORDER — VALACYCLOVIR HYDROCHLORIDE 500 MG/1
1000 TABLET, FILM COATED ORAL ONCE
Status: COMPLETED | OUTPATIENT
Start: 2021-03-03 | End: 2021-03-03

## 2021-03-03 RX ORDER — VALACYCLOVIR HYDROCHLORIDE 1 G/1
1000 TABLET, FILM COATED ORAL 2 TIMES DAILY
Qty: 20 TABLET | Refills: 0 | Status: SHIPPED | OUTPATIENT
Start: 2021-03-03 | End: 2021-03-13

## 2021-03-03 RX ORDER — CEFTRIAXONE 500 MG/1
500 INJECTION, POWDER, FOR SOLUTION INTRAMUSCULAR; INTRAVENOUS ONCE
Status: COMPLETED | OUTPATIENT
Start: 2021-03-03 | End: 2021-03-03

## 2021-03-03 RX ORDER — DOXYCYCLINE 100 MG/1
100 CAPSULE ORAL 2 TIMES DAILY
Qty: 14 CAPSULE | Refills: 0 | Status: SHIPPED | OUTPATIENT
Start: 2021-03-03 | End: 2021-03-10

## 2021-03-03 RX ADMIN — CEFTRIAXONE SODIUM 500 MG: 500 INJECTION, POWDER, FOR SOLUTION INTRAMUSCULAR; INTRAVENOUS at 22:24

## 2021-03-03 RX ADMIN — DOXYCYCLINE 100 MG: 100 TABLET, FILM COATED ORAL at 22:24

## 2021-03-03 RX ADMIN — VALACYCLOVIR HYDROCHLORIDE 1000 MG: 500 TABLET, FILM COATED ORAL at 22:24

## 2021-03-03 ASSESSMENT — FIBROSIS 4 INDEX: FIB4 SCORE: 0.42

## 2021-03-04 NOTE — ED TRIAGE NOTES
"Jaden Hill  35 y.o. M  Chief Complaint   Patient presents with   • Rash     x1 week affecting the penis, scrotum, and inner thighs. Patient describes it as a painful red rash with \"puss.\"    • Exposure to STD     Patient reports he has one sexual partner and she was diagnosed with herpes 2 weeks ago   • Painful Urination     Patient reports he has \"really strong yellow pee\"          Vitals:    03/03/21 1907   BP: 108/80   Pulse: (Abnormal) 124   Resp: 14   Temp: 36.8 °C (98.2 °F)   SpO2: 96%       Triage process explained to patient, apologized for wait time, and returned to [facility].  Pt informed to notify staff of any change in condition. NAD at this time.    "

## 2021-03-04 NOTE — ED PROVIDER NOTES
"ED Provider Note    CHIEF COMPLAINT  Chief Complaint   Patient presents with   • Rash     x1 week affecting the penis, scrotum, and inner thighs. Patient describes it as a painful red rash with \"puss.\"    • Exposure to STD     Patient reports he has one sexual partner and she was diagnosed with herpes 2 weeks ago   • Painful Urination     Patient reports he has \"really strong yellow pee\"        HPI  Jaden Hill is a 35 y.o. male who presents to the emergency room for STD exposure. His partner was recently diagnosed with herpes, heart gonorrhea and chlamydia. She was treated and he developed dental lesions over the last couple of days. Denies any other symptomatology. No prior history of same. No allergies.    REVIEW OF SYSTEMS  See HPI for further details. All other systems are negative.     PAST MEDICAL HISTORY       SOCIAL HISTORY  Social History     Tobacco Use   • Smoking status: Former Smoker     Packs/day: 0.25     Types: Cigarettes   • Smokeless tobacco: Never Used   Substance and Sexual Activity   • Alcohol use: Never     Comment: states no   • Drug use: Yes     Comment: marijuana   • Sexual activity: Not on file       SURGICAL HISTORY   has a past surgical history that includes other and ear reconstruction.    CURRENT MEDICATIONS  Home Medications     Reviewed by Andria Castaneda R.N. (Registered Nurse) on 03/03/21 at 1919  Med List Status: Not Addressed   Medication Last Dose Status   amoxicillin (AMOXIL) 875 MG tablet  Active                ALLERGIES  No Known Allergies    PHYSICAL EXAM  VITAL SIGNS: /84   Pulse 97   Temp 36.8 °C (98.2 °F) (Temporal)   Resp 16   Ht 1.803 m (5' 11\")   Wt 69.3 kg (152 lb 12.5 oz)   SpO2 97%   BMI 21.31 kg/m²  @JAMAL[[id number]::@  Pulse ox interpretation: I interpret this pulse ox as normal.  Constitutional: Alert in no apparent distress. Unkempt  HENT: Normocephalic, Atraumatic, Bilateral external ears normal. Nose normal.   Eyes: Pupils are equal and " reactive.  Lungs: no respiratory distress  Skin: Warm, Dry, dirty. Herpetic blister lesion to dorsal aspect of distal penis to include part of the proximal glans  Neurologic: Alert, Grossly non-focal.     Labs:   Confirmatory UA pending      COURSE & MEDICAL DECISION MAKING  Pertinent Labs & Imaging studies reviewed. (See chart for details)  35-year-old presented to emergency room with the above complaint. He will be given initial dosing here for STD coverage. He'll be sent home on doxycycline and valacyclovir. He has been standing of the need for ongoing absence until lesions resolved. He is understanding of return precautions if needed.     The patient will return for worsening symptoms and is stable at the time of discharge. The patient verbalizes understanding and will comply.    FINAL IMPRESSION  1. Herpes    2. STD exposure               Electronically signed by: Yuniel Myers M.D., 3/3/2021 9:37 PM

## 2021-07-01 ENCOUNTER — HOSPITAL ENCOUNTER (EMERGENCY)
Facility: MEDICAL CENTER | Age: 36
End: 2021-07-01
Payer: MEDICAID

## 2021-07-01 VITALS
BODY MASS INDEX: 21.31 KG/M2 | HEIGHT: 71 IN | HEART RATE: 121 BPM | OXYGEN SATURATION: 100 % | TEMPERATURE: 97.6 F | DIASTOLIC BLOOD PRESSURE: 75 MMHG | RESPIRATION RATE: 24 BRPM | SYSTOLIC BLOOD PRESSURE: 108 MMHG

## 2021-07-01 PROCEDURE — 302449 STATCHG TRIAGE ONLY (STATISTIC)

## 2021-07-01 NOTE — ED NOTES
Pt states that he does not want to be seen by MD and is refusing EKG. The risks of signing out before being seen by an MD/DO were discussed and pt agrees to return for any new/concerning symptoms. AMA/REFUSAL OF CONSENT form was filled out with triage desk and pt left @1630. Security gave pt his backpack at the front entrance.

## 2021-07-01 NOTE — ED NOTES
Backpack in locker 7 per security, security specified that the bag was not searched and is locked away.

## 2021-08-11 ENCOUNTER — HOSPITAL ENCOUNTER (EMERGENCY)
Dept: HOSPITAL 8 - ED | Age: 36
Discharge: LEFT BEFORE DISCHARGE | End: 2021-08-11
Payer: MEDICAID

## 2021-08-11 VITALS — BODY MASS INDEX: 21.42 KG/M2 | HEIGHT: 71 IN | WEIGHT: 153 LBS

## 2021-08-11 DIAGNOSIS — A60.01: Primary | ICD-10-CM

## 2021-08-11 PROCEDURE — 99283 EMERGENCY DEPT VISIT LOW MDM: CPT

## 2021-08-11 NOTE — NUR
-------------------------------------------------------------------------------

          *** Note alonzo in ED - 08/11/21 at 0214 by GERARDO ***          

-------------------------------------------------------------------------------

triage rn: Patient given discharge instructions and they have confirmed that 
they understand the instructions.  Patient ambulatory with steady gait. NAD, 
all questions answered appropriately, denies additional needs at this time. No 
personal belongings left in room after discharge.

## 2021-08-12 ENCOUNTER — HOSPITAL ENCOUNTER (EMERGENCY)
Dept: HOSPITAL 8 - ED | Age: 36
Discharge: HOME | End: 2021-08-12
Payer: MEDICAID

## 2021-08-12 VITALS — WEIGHT: 151.02 LBS | BODY MASS INDEX: 21.14 KG/M2 | HEIGHT: 71 IN

## 2021-08-12 VITALS — SYSTOLIC BLOOD PRESSURE: 119 MMHG | DIASTOLIC BLOOD PRESSURE: 79 MMHG

## 2021-08-12 DIAGNOSIS — A60.01: Primary | ICD-10-CM

## 2021-08-12 PROCEDURE — 99283 EMERGENCY DEPT VISIT LOW MDM: CPT

## 2022-12-28 ENCOUNTER — HOSPITAL ENCOUNTER (EMERGENCY)
Facility: MEDICAL CENTER | Age: 37
End: 2022-12-28
Attending: EMERGENCY MEDICINE
Payer: MEDICAID

## 2022-12-28 VITALS
HEIGHT: 71 IN | TEMPERATURE: 99.1 F | RESPIRATION RATE: 18 BRPM | WEIGHT: 159.83 LBS | OXYGEN SATURATION: 96 % | SYSTOLIC BLOOD PRESSURE: 126 MMHG | DIASTOLIC BLOOD PRESSURE: 88 MMHG | HEART RATE: 114 BPM | BODY MASS INDEX: 22.38 KG/M2
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
## 2022-12-28 DIAGNOSIS — M79.89 SWOLLEN FINGER: ICD-10-CM

## 2022-12-28 PROCEDURE — A6403 STERILE GAUZE>16 <= 48 SQ IN: HCPCS

## 2022-12-28 PROCEDURE — 99283 EMERGENCY DEPT VISIT LOW MDM: CPT

## 2022-12-28 RX ORDER — AMOXICILLIN AND CLAVULANATE POTASSIUM 875; 125 MG/1; MG/1
1 TABLET, FILM COATED ORAL ONCE
Status: DISCONTINUED | OUTPATIENT
Start: 2022-12-28 | End: 2022-12-28

## 2022-12-28 RX ORDER — AMOXICILLIN AND CLAVULANATE POTASSIUM 875; 125 MG/1; MG/1
1 TABLET, FILM COATED ORAL 2 TIMES DAILY
Qty: 14 TABLET | Refills: 0 | Status: SHIPPED | OUTPATIENT
Start: 2022-12-28 | End: 2023-01-04

## 2022-12-29 NOTE — DISCHARGE INSTRUCTIONS
Resume the antibiotic if redness, swelling, or pain worsens.  Keep the finger bandaged.  Call [facility] for a follow-up with Dr. Paiz, the [facility] urgent care or one of the hand doctors if redness, swelling, pus, or pain develoop.   right upper arm

## 2022-12-29 NOTE — ED NOTES
Patient discharged in stable condition per orders. Refused wristband removed per protocol. Patient verbalized understanding of all discharge instructions. All belongings accounted for. Ambulatory for discharge with steady gait.

## 2022-12-29 NOTE — ED PROVIDER NOTES
"ED Provider Note    CHIEF COMPLAINT   Chief Complaint   Patient presents with    Digit Pain     Pt has wound to left fourth digit from dog bite and was seen at [place] for it. They prescribed him antibiotics and pt states he completed the full course. Pt comes because he is concerned it is getting worse.        HPI   Jaden Hill is a 37 y.o. male who presents with swelling of his left nondominant ring finger little more than 2 weeks after being bitten by dog.  He was  2 dogs that were fighting.  He is right-hand dominant.  He was seen at [place] and treated with 5 days of Augmentin.  Since then he has had a mild increase in swelling.  He denies significant pain fever flulike symptoms nausea body aches or lymphangitic streaking.    REVIEW OF SYSTEMS   Pertinent positives include: Fingernail loss was associated with the original injury.  Pertinent negatives include: Palm pain, red streaks, pus.     PAST MEDICAL HISTORY   Denies including no diabetes    CURRENT MEDICATIONS   Home Medications       Reviewed by Karolyn Rios R.N. (Registered Nurse) on 12/28/22 at 1756  Med List Status: Not Addressed     Medication Last Dose Status   amoxicillin (AMOXIL) 875 MG tablet  Active                    ALLERGIES   No Known Allergies    PHYSICAL EXAM  VITAL SIGNS: /88   Pulse (!) 114   Temp 37.3 °C (99.1 °F) (Temporal)   Resp 18   Ht 1.803 m (5' 11\")   Wt 72.5 kg (159 lb 13.3 oz)   SpO2 96%   BMI 22.29 kg/m²   Constitutional: Well developed, Well nourished, tachycardic, afebrile, well-appearing.   HENT: Atraumatic.  Respiratory: Rate and excursion normal.  Musculoskeletal: Mild edema left ring finger, no palm tenderness, flexion and extension limited by edema.  Skin: Skin over the middle and distal phalanges.  Missing the nail.  No purulence.  No lymphangitic streaking.  Neurologic: Finger flexion extension and sensation preserved but movement limited by edema.     COURSE & MEDICAL DECISION " MAKING  Case discussed by voalte text with Dr. Izabel Sams who feels the nail will regrow and that no further course of antibiotics or immediate follow-up with orthopedics is necessary.    Radiology x-ray report reviewed from the [facility] and there was no fracture.  There was some subcutaneous air at the time of injury.    PLAN:   Discharge Medication List as of 12/28/2022  7:41 PM        START taking these medications    Details   amoxicillin-clavulanate (AUGMENTIN) 875-125 MG Tab Take 1 Tablet by mouth 2 times a day for 7 days., Disp-14 Tablet, R-0, Normal         Patient bandaged with antibiotic ointment and a tube gauze dressing  Sent only to take Augmentin if he worsens patient  Return for palm pain spreading redness new fever increasing pain    Zach Paiz M.D.  [address] [facility] [address]  [phone]    Schedule an appointment as soon as possible for a visit   As needed if the finger worsens with pain redness or pus        CONDITION: good    FINAL IMPRESSION  1. Swollen finger    2. Dog bite, initial encounter          Electronically signed by: José Luis Newton M.D., 12/28/2022 7:06 PM

## 2022-12-29 NOTE — ED TRIAGE NOTES
"Chief Complaint   Patient presents with    Digit Pain     Pt has wound to left fourth digit from dog bite and was seen at [place] for it. They prescribed him antibiotics and pt states he completed the full course. Pt comes because he is concerned it is getting worse.      /88   Pulse (!) 114   Temp 37.3 °C (99.1 °F) (Temporal)   Resp 18   Ht 1.803 m (5' 11\")   Wt 72.5 kg (159 lb 13.3 oz)   SpO2 96%   BMI 22.29 kg/m²     Pt ambulatory from lobby with steady gait. Pt has dog with him.  Pt's finger is swollen and discolored. Pt states he still has feeling in that finger and that the whole finger hurts. Finger is open to air. No drainage present.     Pt is alert and oriented, speaking in full sentences, follows commands and responds appropriately to questions. Resp are even and unlabored.      Pt placed in lobby. Pt educated on triage process. Pt encouraged to alert staff for any changes.     Patient and staff wearing appropriate PPE    "

## 2022-12-29 NOTE — ED NOTES
Dressing and finger splint applied to fourth digit on left hand per ERP. Pt given supplies to change dressing at home. Pt verbalizes understanding.